# Patient Record
Sex: MALE | URBAN - METROPOLITAN AREA
[De-identification: names, ages, dates, MRNs, and addresses within clinical notes are randomized per-mention and may not be internally consistent; named-entity substitution may affect disease eponyms.]

---

## 2018-01-01 ENCOUNTER — OFFICE VISIT (OUTPATIENT)
Dept: URBAN - METROPOLITAN AREA CLINIC 88 | Facility: CLINIC | Age: 83
End: 2018-01-01
Payer: MEDICARE

## 2018-01-01 DIAGNOSIS — H35.3190 NONEXUDATIVE AGE-RELATED MACULAR DEGENERATION: ICD-10-CM

## 2018-01-01 DIAGNOSIS — Z96.1 PRESENCE OF INTRAOCULAR LENS: ICD-10-CM

## 2018-01-01 DIAGNOSIS — H35.363 DRUSEN (DEGENERATIVE) OF MACULA, BILATERAL: ICD-10-CM

## 2018-01-01 DIAGNOSIS — H43.12 VITREOUS HEMORRHAGE, LEFT EYE: Primary | ICD-10-CM

## 2018-01-01 PROCEDURE — 99214 OFFICE O/P EST MOD 30 MIN: CPT | Performed by: OPHTHALMOLOGY

## 2018-01-01 ASSESSMENT — INTRAOCULAR PRESSURE
OD: 14
OS: 14

## 2018-12-12 NOTE — IMPRESSION/PLAN
Impression: Vitreous hemorrhage, left eye: H43.12. OS. Condition: will continue to monitor. Vision: vision affected. Plan: Discussed diagnosis in detail with patient. The patient was advised to limit physical activity and limit lifting to 50lbs. Patient was instructed to report any type of visual field changes or visual changes immediately. Patient was told to sleep with 2 pillows in order to allow the blood to gravitate.

## 2018-12-12 NOTE — IMPRESSION/PLAN
Impression: Drusen (degenerative) of macula, bilateral: H35.363. OU. OU. Condition: established, stable. Plan: Advised patient of condition. Vitamins recommended.

## 2018-12-12 NOTE — IMPRESSION/PLAN
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 OU. Condition: established, stable.  Plan: Stable

## 2018-12-12 NOTE — IMPRESSION/PLAN
Impression: Nonexudative age-related macular degeneration: H35.3190. OU. Condition: established, stable. Symptoms: will continue to monitor. Plan: Macular degeneration, dry type with stable vision. Will continue to monitor vision and the patient has been instructed to call with any vision changes. Use of vitamins has shown to improve the effects of ARMD.  Counseling about the benefits and/or risks of the Age-Related Eye Disease Study (AREDS) formulation for preventing progression of age-related macular degeneration (AMD) was provided to the patient and/or caregiver(s).

## 2019-01-01 ENCOUNTER — OFFICE VISIT (OUTPATIENT)
Dept: URBAN - METROPOLITAN AREA CLINIC 88 | Facility: CLINIC | Age: 84
End: 2019-01-01
Payer: MEDICARE

## 2019-01-01 DIAGNOSIS — H10.413 CONJUNCTIVITIS - ALLERGIC: ICD-10-CM

## 2019-01-01 DIAGNOSIS — H26.493 OTHER SECONDARY CATARACT, BILATERAL: ICD-10-CM

## 2019-01-01 DIAGNOSIS — H35.362 DRUSEN (DEGENERATIVE) OF MACULA, LEFT EYE: ICD-10-CM

## 2019-01-01 PROCEDURE — 99214 OFFICE O/P EST MOD 30 MIN: CPT | Performed by: OPHTHALMOLOGY

## 2019-01-01 PROCEDURE — 99213 OFFICE O/P EST LOW 20 MIN: CPT | Performed by: OPHTHALMOLOGY

## 2019-01-01 ASSESSMENT — INTRAOCULAR PRESSURE
OS: 16
OD: 15
OD: 11
OD: 12
OS: 12
OS: 11

## 2019-01-01 ASSESSMENT — VISUAL ACUITY
OD: 20/30
OS: 20/40

## 2019-01-21 NOTE — IMPRESSION/PLAN
Impression: Vitreous hemorrhage, left eye: H43.12 OS. Condition: will likely improve. Symptoms: will continue to monitor. Plan: Discussed diagnosis in detail with patient. The patient was advised to limit physical activity and limit lifting to 50lbs. Patient was instructed to report any type of visual field changes or visual changes immediately. Patient was told to sleep with 2 pillows in order to allow the blood to gravitate. Discussed with patient if the [facility] does not clear within 1 month. May need Avastin tx. Risks/benefits were discussed.

## 2019-03-04 NOTE — IMPRESSION/PLAN
Impression: Drusen (degenerative) of macula, left eye: H35.362. OS. Plan: Advised patient of condition. Vitamins recommended.

## 2019-03-04 NOTE — IMPRESSION/PLAN
Impression: Vitreous hemorrhage, left eye: H43.12 OS. Condition: resolving . Plan: Discussed diagnosis in detail with patient. Patient was instructed to report any type of visual field changes or visual changes immediately.

## 2019-03-04 NOTE — IMPRESSION/PLAN
Impression: Nonexudative age-related macular degeneration: H35.3190 OU. Condition: established, stable. Plan: Macular degeneration, dry type with stable vision. Will continue to monitor vision and the patient has been instructed to call with any vision changes. Use of vitamins has shown to improve the effects of ARMD.  Counseling about the benefits and/or risks of the Age-Related Eye Disease Study (AREDS) formulation for preventing progression of age-related macular degeneration (AMD) was provided to the patient and/or caregiver(s).

## 2019-03-04 NOTE — IMPRESSION/PLAN
Impression: Other secondary cataract, bilateral: H26.493. OU. Condition: established, stable. Plan: Discussed diagnosis in detail with patient. May need Yag Laser treatment in the future. Discussed risks/benefits of laser TX.

## 2019-07-29 NOTE — IMPRESSION/PLAN
Impression: Conjunctivitis - Allergic: H10.413. Plan: Discussed diagnosis in detail with patient. OK to use OTC Zaditor 1 qtt BID OU.

## 2019-07-29 NOTE — IMPRESSION/PLAN
Impression: Vitreous hemorrhage, left eye: H43.12 OS. Condition: resolved. Plan: Advised patient of condition. VH has resolved. No future appointment is needed at this time. I would recommend patient return on a yearly basis.

## 2019-07-29 NOTE — IMPRESSION/PLAN
Impression: Nonexudative age-related macular degeneration: H35.3190. Condition: established, stable. Symptoms: will continue to monitor. Plan: Macular degeneration, dry type with stable vision. Will continue to monitor vision and the patient has been instructed to call with any vision changes. Use of vitamins has shown to improve the effects of ARMD.  Counseling about the benefits and/or risks of the Age-Related Eye Disease Study (AREDS) formulation for preventing progression of age-related macular degeneration (AMD) was provided to the patient and/or caregiver(s).